# Patient Record
Sex: MALE | Race: WHITE | NOT HISPANIC OR LATINO
[De-identification: names, ages, dates, MRNs, and addresses within clinical notes are randomized per-mention and may not be internally consistent; named-entity substitution may affect disease eponyms.]

---

## 2020-03-24 PROBLEM — Z00.00 ENCOUNTER FOR PREVENTIVE HEALTH EXAMINATION: Status: ACTIVE | Noted: 2020-03-24

## 2020-04-06 ENCOUNTER — APPOINTMENT (OUTPATIENT)
Dept: CARDIOLOGY | Facility: CLINIC | Age: 65
End: 2020-04-06
Payer: COMMERCIAL

## 2020-04-06 ENCOUNTER — NON-APPOINTMENT (OUTPATIENT)
Age: 65
End: 2020-04-06

## 2020-04-06 VITALS
HEART RATE: 52 BPM | DIASTOLIC BLOOD PRESSURE: 86 MMHG | WEIGHT: 177 LBS | SYSTOLIC BLOOD PRESSURE: 140 MMHG | RESPIRATION RATE: 15 BRPM | HEIGHT: 71 IN | BODY MASS INDEX: 24.78 KG/M2

## 2020-04-06 PROCEDURE — 93000 ELECTROCARDIOGRAM COMPLETE: CPT

## 2020-04-06 PROCEDURE — 99204 OFFICE O/P NEW MOD 45 MIN: CPT

## 2020-04-06 NOTE — PHYSICAL EXAM
[General Appearance - Well Developed] : well developed [Normal Appearance] : normal appearance [Well Groomed] : well groomed [General Appearance - Well Nourished] : well nourished [No Deformities] : no deformities [General Appearance - In No Acute Distress] : no acute distress [Normal Conjunctiva] : the conjunctiva exhibited no abnormalities [Normal Oral Mucosa] : normal oral mucosa [Normal Oropharynx] : normal oropharynx [5th Left ICS - MCL] : palpated at the 5th LICS in the midclavicular line [Normal] : normal [No Precordial Heave] : no precordial heave was noted [Normal Rate] : normal [Rhythm Regular] : regular [No Gallop] : no gallop heard [II] : a grade 2 [2+] : left 2+ [No Pitting Edema] : no pitting edema present [Respiration, Rhythm And Depth] : normal respiratory rhythm and effort [Exaggerated Use Of Accessory Muscles For Inspiration] : no accessory muscle use [Auscultation Breath Sounds / Voice Sounds] : lungs were clear to auscultation bilaterally [Bowel Sounds] : normal bowel sounds [Abdomen Soft] : soft [Abnormal Walk] : normal gait [Gait - Sufficient For Exercise Testing] : the gait was sufficient for exercise testing [Nail Clubbing] : no clubbing of the fingernails [Cyanosis, Localized] : no localized cyanosis [Skin Color & Pigmentation] : normal skin color and pigmentation [Skin Turgor] : normal skin turgor [] : no rash [Oriented To Time, Place, And Person] : oriented to person, place, and time [Affect] : the affect was normal [Mood] : the mood was normal [No Anxiety] : not feeling anxious [Click] : no click [Distant] : the heart sounds were ~L not distant [Pericardial Rub] : no pericardial rub

## 2020-04-06 NOTE — DISCUSSION/SUMMARY
[FreeTextEntry1] : This is a 65-year-old male with past medical history significant for supraventricular tachycardia (probably atrial fibrillation), status post ablation at [facility] by Dr. Olivier 2020, status post loop recorder implantation, hypertension, status post appendectomy, history of syncope, "cardiomyopathy", and comes in for cardiac consultation.\par He denies chest pain, shortness of breath, dizziness or syncope.  He has no history of rheumatic fever.\par He does not drink excessive caffeine or alcohol.\par The patient reports that he was told years ago he had a cardiomyopathy, but his ejection fraction is normal.  He reports having a normal exercise stress test a few years ago.  Controlling his hypertension has been difficult, but is seeing his nephrologist, Dr. Wagner with good results.\par His cardiac risk factors include hypertension, positive family history of atherosclerosis (mother  from a stroke at age 57).  He reports having a normal cholesterol.\par Electrocardiogram 2020 demonstrated sinus bradycardia at 52 beats per minutes otherwise unremarkable.\par Patient's medications today include sotalol 80 mg twice a day, Xarelto 15 mg in the evening, Cozaar 100 mg daily, chlorthalidone 25 mg dose in the morning, Coreg 25 mg twice a day and Pepcid 20 mg twice a day.  He is not on aspirin therapy.\par He reports his blood pressures and stable on this medication.  He will followup with his nephrologist and primary care physician.  He also reports to have a history of elevated pulmonary pressures.\par He had been followed at [place] by Dr. Martins in the past.\par He is instructed to schedule echo Doppler examination to evaluate left ventricular function, chamber size, murmur, and rule out hypertrophy.\par  He will followup with his electrophysiologist and nephrologist as well as his primary care physician.\par He is currently hemodynamically stable and asymptomatic from cardiac standpoint.

## 2020-04-06 NOTE — REASON FOR VISIT
[Consultation] : a consultation regarding [Cardiomyopathy] : cardiomyopathy [Hypertension] : hypertension [Mitral Regurgitation] : mitral regurgitation [FreeTextEntry1] : s/p ablation, murmur

## 2020-06-22 ENCOUNTER — APPOINTMENT (OUTPATIENT)
Dept: CARDIOLOGY | Facility: CLINIC | Age: 65
End: 2020-06-22
Payer: COMMERCIAL

## 2020-06-22 ENCOUNTER — NON-APPOINTMENT (OUTPATIENT)
Age: 65
End: 2020-06-22

## 2020-06-22 VITALS
WEIGHT: 174 LBS | HEIGHT: 71 IN | BODY MASS INDEX: 24.36 KG/M2 | HEART RATE: 60 BPM | RESPIRATION RATE: 15 BRPM | SYSTOLIC BLOOD PRESSURE: 125 MMHG | DIASTOLIC BLOOD PRESSURE: 79 MMHG

## 2020-06-22 PROCEDURE — 99213 OFFICE O/P EST LOW 20 MIN: CPT

## 2020-06-22 PROCEDURE — 93000 ELECTROCARDIOGRAM COMPLETE: CPT

## 2020-06-22 PROCEDURE — 93306 TTE W/DOPPLER COMPLETE: CPT

## 2020-08-25 RX ORDER — CARVEDILOL 25 MG/1
25 TABLET, FILM COATED ORAL
Qty: 180 | Refills: 1 | Status: DISCONTINUED | COMMUNITY
Start: 2020-04-06 | End: 2020-08-25

## 2020-08-31 ENCOUNTER — APPOINTMENT (OUTPATIENT)
Dept: CARDIOLOGY | Facility: CLINIC | Age: 65
End: 2020-08-31
Payer: COMMERCIAL

## 2020-08-31 VITALS
WEIGHT: 180 LBS | DIASTOLIC BLOOD PRESSURE: 80 MMHG | OXYGEN SATURATION: 99 % | RESPIRATION RATE: 16 BRPM | BODY MASS INDEX: 25.2 KG/M2 | HEIGHT: 71 IN | HEART RATE: 67 BPM | SYSTOLIC BLOOD PRESSURE: 128 MMHG

## 2020-08-31 PROCEDURE — 99213 OFFICE O/P EST LOW 20 MIN: CPT

## 2020-11-17 ENCOUNTER — NON-APPOINTMENT (OUTPATIENT)
Age: 65
End: 2020-11-17

## 2020-11-17 ENCOUNTER — APPOINTMENT (OUTPATIENT)
Dept: CARDIOLOGY | Facility: CLINIC | Age: 65
End: 2020-11-17
Payer: COMMERCIAL

## 2020-11-17 VITALS
DIASTOLIC BLOOD PRESSURE: 88 MMHG | RESPIRATION RATE: 16 BRPM | TEMPERATURE: 97.9 F | SYSTOLIC BLOOD PRESSURE: 132 MMHG | HEIGHT: 71 IN | BODY MASS INDEX: 25.48 KG/M2 | WEIGHT: 182 LBS | HEART RATE: 59 BPM

## 2020-11-17 PROCEDURE — 93000 ELECTROCARDIOGRAM COMPLETE: CPT

## 2020-11-17 PROCEDURE — 99214 OFFICE O/P EST MOD 30 MIN: CPT

## 2020-11-18 ENCOUNTER — NON-APPOINTMENT (OUTPATIENT)
Age: 65
End: 2020-11-18

## 2021-02-16 ENCOUNTER — NON-APPOINTMENT (OUTPATIENT)
Age: 66
End: 2021-02-16

## 2021-02-19 ENCOUNTER — APPOINTMENT (OUTPATIENT)
Dept: ORTHOPEDIC SURGERY | Facility: CLINIC | Age: 66
End: 2021-02-19
Payer: COMMERCIAL

## 2021-02-19 DIAGNOSIS — M51.36 OTHER INTERVERTEBRAL DISC DEGENERATION, LUMBAR REGION: ICD-10-CM

## 2021-02-19 DIAGNOSIS — M79.10 MYALGIA, UNSPECIFIED SITE: ICD-10-CM

## 2021-02-19 DIAGNOSIS — M60.9 MYOSITIS, UNSPECIFIED: ICD-10-CM

## 2021-02-19 PROCEDURE — 99204 OFFICE O/P NEW MOD 45 MIN: CPT | Mod: 25

## 2021-02-19 PROCEDURE — 72100 X-RAY EXAM L-S SPINE 2/3 VWS: CPT

## 2021-02-19 PROCEDURE — 20552 NJX 1/MLT TRIGGER POINT 1/2: CPT

## 2021-02-19 PROCEDURE — 99072 ADDL SUPL MATRL&STAF TM PHE: CPT

## 2021-03-01 ENCOUNTER — RX RENEWAL (OUTPATIENT)
Age: 66
End: 2021-03-01

## 2021-04-12 ENCOUNTER — LABORATORY RESULT (OUTPATIENT)
Age: 66
End: 2021-04-12

## 2021-04-13 ENCOUNTER — NON-APPOINTMENT (OUTPATIENT)
Age: 66
End: 2021-04-13

## 2021-04-13 ENCOUNTER — APPOINTMENT (OUTPATIENT)
Dept: CARDIOLOGY | Facility: CLINIC | Age: 66
End: 2021-04-13
Payer: COMMERCIAL

## 2021-04-13 VITALS
DIASTOLIC BLOOD PRESSURE: 80 MMHG | BODY MASS INDEX: 26.04 KG/M2 | TEMPERATURE: 98 F | RESPIRATION RATE: 16 BRPM | HEIGHT: 71 IN | HEART RATE: 65 BPM | WEIGHT: 186 LBS | SYSTOLIC BLOOD PRESSURE: 140 MMHG

## 2021-04-13 PROCEDURE — 99214 OFFICE O/P EST MOD 30 MIN: CPT

## 2021-04-13 PROCEDURE — 93000 ELECTROCARDIOGRAM COMPLETE: CPT

## 2021-04-13 PROCEDURE — 99072 ADDL SUPL MATRL&STAF TM PHE: CPT

## 2021-04-13 NOTE — PHYSICAL EXAM
[General Appearance - Well Developed] : well developed [Normal Appearance] : normal appearance [Well Groomed] : well groomed [General Appearance - Well Nourished] : well nourished [No Deformities] : no deformities [General Appearance - In No Acute Distress] : no acute distress [Normal Conjunctiva] : the conjunctiva exhibited no abnormalities [Normal Oral Mucosa] : normal oral mucosa [Normal Oropharynx] : normal oropharynx [Normal Jugular Venous V Waves Present] : normal jugular venous V waves present [Respiration, Rhythm And Depth] : normal respiratory rhythm and effort [Exaggerated Use Of Accessory Muscles For Inspiration] : no accessory muscle use [Auscultation Breath Sounds / Voice Sounds] : lungs were clear to auscultation bilaterally [Bowel Sounds] : normal bowel sounds [Abdomen Soft] : soft [Abnormal Walk] : normal gait [Gait - Sufficient For Exercise Testing] : the gait was sufficient for exercise testing [Nail Clubbing] : no clubbing of the fingernails [Cyanosis, Localized] : no localized cyanosis [Skin Color & Pigmentation] : normal skin color and pigmentation [Skin Turgor] : normal skin turgor [] : no rash [Oriented To Time, Place, And Person] : oriented to person, place, and time [Impaired Insight] : insight and judgment were intact [Affect] : the affect was normal [Mood] : the mood was normal [No Anxiety] : not feeling anxious [5th Left ICS - MCL] : palpated at the 5th LICS in the midclavicular line [Normal] : normal [No Precordial Heave] : no precordial heave was noted [Normal Rate] : normal [Rhythm Regular] : regular [Normal S1] : normal S1 [Normal S2] : normal S2 [No Gallop] : no gallop heard [II] : a grade 2 [2+] : left 2+ [No Abnormalities] : the abdominal aorta was not enlarged and no bruit was heard [No Pitting Edema] : no pitting edema present [S3] : no S3 [S4] : no S4 [Click] : no click [Pericardial Rub] : no pericardial rub

## 2021-04-13 NOTE — REASON FOR VISIT
[Follow-Up - Clinic] : a clinic follow-up of [Cardiomyopathy] : cardiomyopathy [Hypertension] : hypertension [Mitral Regurgitation] : mitral regurgitation [FreeTextEntry1] : This is a 65 year year old male here today for follow up cardiac followup evaluation. \par He has a past medical history significant for  supraventricular tachycardia (probably atrial fibrillation), status post ablation at [facility] by Dr. Olivier February 2020, status post loop recorder implantation, hypertension, status post appendectomy, history of syncope, "cardiomyopathy".\par \par Today he is feeling generally well and does not have any complaints at this time. He does admit that he is non compliant with his blood pressure medications because at home his home readings he is reporting to be under 100.\par \par He denies fever, chills, weight loss, malaise, rash, alteration bowel habits, weakness, abdominal  pain, bloating, changes in urination, visual disturbances, chest pain, headaches, dizziness, heart palpitations, recent episodes of syncope or falls, SOB, or dyspnea at this time.\par \par

## 2021-04-13 NOTE — ASSESSMENT
[FreeTextEntry1] : This is a 65 year year old male here today for follow up cardiac followup evaluation. \par He has a past medical history significant for  supraventricular tachycardia (probably atrial fibrillation), status post ablation at [facility] by Dr. Olivier February 2020, status post loop recorder implantation, hypertension, status post appendectomy, history of syncope, "cardiomyopathy".\par \par Today he is feeling generally well and does not have any complaints at this time. He does admit that he is non compliant with his blood pressure medications because at home his home readings he is reporting to be under 100.\par \par -Pt is stable from a cardiac standpoint and does not have any complaints at this time. He did have injections done in his back by Dr. Calderon but he states he will "always have back problems".\par \par -BP is elevated in today's visit and patient is on Carvedilol 12.5mg PO BID, Chlorthalidone 25mg PO daily in the morning, Losartan 100mg PO Daily in the evening, Sotalol 80mg (40 mg BID). He states that he does not wish to add or change medication at this time. He is also refusing a 24 hour BP cuff to assess what his home BP is. He states that he has his own BP cuff at home but I explained that this is not efficient enough. I have asked that he follow up in 1 month and to bring his home BP cuff with him to his next appointment. \par \par -New blood work done today to evaluate lipid profile. He is currently on Rosuvastatin 20mg PO DAILY and was placed on this due to ASCVD Risk of 18% based off of his previous blood work. \par \par -EKG done 4/13/2021 which demonstrated regular sinus rhythm with nonspecific ST-T wave changes BPM of 65. He is on Xarelto 20mg PO daily for management of his AFIB.\par \par -The patient had an exercise stress test done "a couple of years ago" and does not wish to do one at this time. He is refusing to have this repeated.\par  \par -Echocardiogram done June 22, 20202 demonstrated minimal mitral, pulmonic and tricuspid regurgitation with normal LVSF.\par \par I have discussed the plan of care with Mr. DAISY SHAH  and he  will follow up in 1 month. He is compliant with all of his  medications.\par \par The patient understands that aerobic exercises must be increased to minutes 4 times/week and a detailed discussion of lifestyle modification was done today. \par The patient has a good understanding of the diagnosis, treatment plan and lifestyle modification. \par He will contact me at the office for any questions with their care or any changes in their health status.\par \par The plan of care was discussed with supervising physician, Dr. Hopper while present in the office at the time of the visit. \par \par Antione HARRINGTON

## 2021-04-13 NOTE — DISCUSSION/SUMMARY
[FreeTextEntry1] : Dr. Hopper [facility] (PRIOR VISIT): \par This is a 65-year-old male with past medical history significant for supraventricular tachycardia (probably atrial fibrillation), status post ablation at [facility] by Dr. Olivier 2020, status post loop recorder implantation, hypertension, status post appendectomy, history of syncope, "cardiomyopathy", and comes in for cardiac follow-up evaluation.\par The patient remained stable from a cardiac standpoint.  He does report occasional episodes of lower blood pressure without symptoms.  I have asked him to contact his nephrologist regarding this issue.  For now I have asked him to take his chlorthalidone in the morning and his losartan in the evening.  He remains on Coreg and sotalol twice per day\par He denies chest pain, shortness of breath, dizziness or syncope.  He has no history of rheumatic fever.\par He does not drink excessive caffeine or alcohol.\par The patient reports that he was told years ago he had an enlarged heart consistent with a cardiomyopathy, but his ejection fraction is normal.  He reports having a normal exercise stress test a few years ago.  Controlling his hypertension has been difficult, but is seeing his nephrologist, Dr. Wagner with good results.\par The patient had blood work done 2020 which demonstrated a creatinine of 1.18, GFR 65 cc/min cholesterol 174, triglycerides 82, HDL of 36, and direct LDL of 128 mg/dL.\par I have informed the patient that based on his creatinine and renal function his Xarelto dose should be 20 mg/day in the evening.  The patient had been taking the dosage in the morning but will now change to the evening.  He will contact his nephrologist to lower the dose in the past, as well as his electrophysiologist regarding adjustment in Xarelto dosage.\par \par His cardiac risk factors include hypertension, positive family history of atherosclerosis (mother  from a stroke at age 57).  He reports having a normal cholesterol.\par Electrocardiogram 2020 demonstrated sinus bradycardia at 58 beats per minutes otherwise unremarkable.\par Patient's medications today include sotalol 80 mg twice a day, Xarelto 15 mg in the evening, Cozaar 100 mg daily, chlorthalidone 25 mg dose in the morning, Coreg 25 mg twice a day and Pepcid 20 mg twice a \par He had been followed at [place] by Dr. Martins in the past.\par He will have an echo Doppler examination to evaluate left ventricular function, chamber size, murmur, and rule out hypertrophy.\par He is currently hemodynamically stable and asymptomatic from cardiac standpoint.

## 2021-04-14 RX ORDER — TIZANIDINE 4 MG/1
4 TABLET ORAL
Qty: 30 | Refills: 0 | Status: COMPLETED | COMMUNITY
Start: 2021-02-19 | End: 2021-04-14

## 2021-04-14 RX ORDER — METHYLPREDNISOLONE 4 MG/1
4 TABLET ORAL
Qty: 1 | Refills: 1 | Status: COMPLETED | COMMUNITY
Start: 2021-02-19 | End: 2021-04-14

## 2021-04-16 ENCOUNTER — NON-APPOINTMENT (OUTPATIENT)
Age: 66
End: 2021-04-16

## 2021-04-27 ENCOUNTER — NON-APPOINTMENT (OUTPATIENT)
Age: 66
End: 2021-04-27

## 2021-05-11 ENCOUNTER — RX RENEWAL (OUTPATIENT)
Age: 66
End: 2021-05-11

## 2021-07-08 ENCOUNTER — NON-APPOINTMENT (OUTPATIENT)
Age: 66
End: 2021-07-08

## 2021-07-14 ENCOUNTER — NON-APPOINTMENT (OUTPATIENT)
Age: 66
End: 2021-07-14

## 2021-07-26 ENCOUNTER — NON-APPOINTMENT (OUTPATIENT)
Age: 66
End: 2021-07-26

## 2021-07-29 ENCOUNTER — NON-APPOINTMENT (OUTPATIENT)
Age: 66
End: 2021-07-29

## 2021-08-16 ENCOUNTER — APPOINTMENT (OUTPATIENT)
Dept: CARDIOLOGY | Facility: CLINIC | Age: 66
End: 2021-08-16

## 2021-10-25 ENCOUNTER — RX RENEWAL (OUTPATIENT)
Age: 66
End: 2021-10-25

## 2021-11-10 ENCOUNTER — APPOINTMENT (OUTPATIENT)
Dept: CARDIOLOGY | Facility: CLINIC | Age: 66
End: 2021-11-10

## 2022-01-12 ENCOUNTER — LABORATORY RESULT (OUTPATIENT)
Age: 67
End: 2022-01-12

## 2022-01-12 ENCOUNTER — APPOINTMENT (OUTPATIENT)
Dept: CARDIOLOGY | Facility: CLINIC | Age: 67
End: 2022-01-12
Payer: COMMERCIAL

## 2022-01-12 ENCOUNTER — NON-APPOINTMENT (OUTPATIENT)
Age: 67
End: 2022-01-12

## 2022-01-12 VITALS
OXYGEN SATURATION: 97 % | HEIGHT: 71 IN | TEMPERATURE: 98 F | SYSTOLIC BLOOD PRESSURE: 129 MMHG | HEART RATE: 58 BPM | WEIGHT: 175 LBS | BODY MASS INDEX: 24.5 KG/M2 | DIASTOLIC BLOOD PRESSURE: 81 MMHG | RESPIRATION RATE: 15 BRPM

## 2022-01-12 PROCEDURE — 99072 ADDL SUPL MATRL&STAF TM PHE: CPT

## 2022-01-12 PROCEDURE — 99214 OFFICE O/P EST MOD 30 MIN: CPT

## 2022-01-12 PROCEDURE — 93000 ELECTROCARDIOGRAM COMPLETE: CPT

## 2022-01-12 RX ORDER — FAMOTIDINE 20 MG/1
20 TABLET, FILM COATED ORAL TWICE DAILY
Refills: 0 | Status: DISCONTINUED | COMMUNITY
Start: 2020-04-06 | End: 2022-01-12

## 2022-01-12 NOTE — ASSESSMENT
[FreeTextEntry1] : Prior note nurse practitioner Lorena April 13, 2021::\par \par This is a 65 year year old male here today for follow up cardiac followup evaluation. \par He has a past medical history significant for  supraventricular tachycardia (probably atrial fibrillation), status post ablation at [facility] by Dr. Olivier February 2020, status post loop recorder implantation, hypertension, status post appendectomy, history of syncope, "cardiomyopathy".\par \par Today he is feeling generally well and does not have any complaints at this time. He does admit that he is non compliant with his blood pressure medications because at home his home readings he is reporting to be under 100.\par \par -Pt is stable from a cardiac standpoint and does not have any complaints at this time. He did have injections done in his back by Dr. Calderon but he states he will "always have back problems".\par \par -BP is elevated in today's visit and patient is on Carvedilol 12.5mg PO BID, Chlorthalidone 25mg PO daily in the morning, Losartan 100mg PO Daily in the evening, Sotalol 80mg (40 mg BID). He states that he does not wish to add or change medication at this time. He is also refusing a 24 hour BP cuff to assess what his home BP is. He states that he has his own BP cuff at home but I explained that this is not efficient enough. I have asked that he follow up in 1 month and to bring his home BP cuff with him to his next appointment. \par \par -New blood work done today to evaluate lipid profile. He is currently on Rosuvastatin 20mg PO DAILY and was placed on this due to ASCVD Risk of 18% based off of his previous blood work. \par \par -EKG done 4/13/2021 which demonstrated regular sinus rhythm with nonspecific ST-T wave changes BPM of 65. He is on Xarelto 20mg PO daily for management of his AFIB.\par \par -The patient had an exercise stress test done "a couple of years ago" and does not wish to do one at this time. He is refusing to have this repeated.\par  \par -Echocardiogram done June 22, 20202 demonstrated minimal mitral, pulmonic and tricuspid regurgitation with normal LVSF.\par \par I have discussed the plan of care with Mr. DAISY SHAH  and he  will follow up in 1 month. He is compliant with all of his  medications.\par \par The patient understands that aerobic exercises must be increased to minutes 4 times/week and a detailed discussion of lifestyle modification was done today. \par The patient has a good understanding of the diagnosis, treatment plan and lifestyle modification. \par He will contact me at the office for any questions with their care or any changes in their health status.\par \par The plan of care was discussed with supervising physician, Dr. Hopper while present in the office at the time of the visit. \par \par Antione HARRINGTON

## 2022-01-12 NOTE — REASON FOR VISIT
[CV Risk Factors and Non-Cardiac Disease] : CV risk factors and non-cardiac disease [Hyperlipidemia] : hyperlipidemia [Follow-Up - Clinic] : a clinic follow-up of [Cardiomyopathy] : cardiomyopathy [Hypertension] : hypertension [Mitral Regurgitation] : mitral regurgitation [FreeTextEntry1] : s/p ablation, murmur

## 2022-01-12 NOTE — DISCUSSION/SUMMARY
[FreeTextEntry1] : This is a 66-year-old male with past medical history significant for supraventricular tachycardia (probably atrial fibrillation), status post ablation at [facility] by Dr. Olivier 2020, status post loop recorder implantation, hypertension, status post appendectomy, history of syncope, "cardiomyopathy", and comes in for cardiac follow-up evaluation.  He denies chest pain, shortness of breath, palpitations, dizziness or syncope.\par Electrocardiogram done 2022 demonstrates sinus bradycardia rate 58 beats per minutes otherwise remarkable for poor R wave progression.\par The patient is feeling well at this time.  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  He also is riding his stationary bike and feels that he needs to improve his tone and aerobic capacity.\par He will have new blood work done today for lipid profile.\par He will continue his exercise on a regular basis.\par He does not wish to pursue any type of diagnostic evaluation including exercise stress test at this time.\par \par The patient remained stable from a cardiac standpoint.  He does report occasional episodes of lower blood pressure without symptoms.  I have asked him to contact his nephrologist regarding this issue.  For now I have asked him to take his chlorthalidone in the morning and his losartan in the evening.  He remains on Coreg and sotalol twice per day\par He denies chest pain, shortness of breath, dizziness or syncope.  He has no history of rheumatic fever.\par He does not drink excessive caffeine or alcohol.\par The patient reports that he was told years ago he had an enlarged heart consistent with a cardiomyopathy, but his ejection fraction is normal.  He reports having a normal exercise stress test a few years ago.  Controlling his hypertension has been difficult, but is seeing his nephrologist, Dr. Wagner with good results.\par The patient had blood work done 2020 which demonstrated a creatinine of 1.18, GFR 65 cc/min cholesterol 174, triglycerides 82, HDL of 36, and direct LDL of 128 mg/dL.\par I have informed the patient that based on his creatinine and renal function his Xarelto dose should be 20 mg/day in the evening.  The patient had been taking the dosage in the morning but will now change to the evening.  He will contact his nephrologist to lower the dose in the past, as well as his electrophysiologist regarding adjustment in Xarelto dosage.\par \par His cardiac risk factors include hypertension, positive family history of atherosclerosis (mother  from a stroke at age 57).  He reports having a normal cholesterol.\par Electrocardiogram 2020 demonstrated sinus bradycardia at 58 beats per minutes otherwise unremarkable.\par Patient's medications today include sotalol 80 mg twice a day, Xarelto 15 mg in the evening, Cozaar 100 mg daily, chlorthalidone 25 mg dose in the morning, Coreg 25 mg twice a day and Pepcid 20 mg twice a \par He had been followed at [place] by Dr. Martins in the past.\par He will have an echo Doppler examination to evaluate left ventricular function, chamber size, murmur, and rule out hypertrophy.\par He is currently hemodynamically stable and asymptomatic from cardiac standpoint.

## 2022-06-08 ENCOUNTER — APPOINTMENT (OUTPATIENT)
Dept: CARDIOLOGY | Facility: CLINIC | Age: 67
End: 2022-06-08
Payer: COMMERCIAL

## 2022-06-08 ENCOUNTER — NON-APPOINTMENT (OUTPATIENT)
Age: 67
End: 2022-06-08

## 2022-06-08 VITALS
BODY MASS INDEX: 24.08 KG/M2 | WEIGHT: 172 LBS | OXYGEN SATURATION: 98 % | HEIGHT: 71 IN | RESPIRATION RATE: 15 BRPM | TEMPERATURE: 98.5 F | HEART RATE: 51 BPM

## 2022-06-08 PROCEDURE — 99214 OFFICE O/P EST MOD 30 MIN: CPT | Mod: 25

## 2022-06-08 PROCEDURE — 93000 ELECTROCARDIOGRAM COMPLETE: CPT

## 2022-06-08 NOTE — DISCUSSION/SUMMARY
[FreeTextEntry1] : This is a 66-year-old male with past medical history significant for supraventricular tachycardia (probably atrial fibrillation), status post ablation at [facility] by Dr. Olivier 2020, status post loop recorder implantation, hypertension, status post appendectomy, history of syncope, "cardiomyopathy", and comes in for cardiac follow-up evaluation.  He denies chest pain, shortness of breath, palpitations, dizziness or syncope.\par Electrocardiogram done 2022 demonstrates sinus bradycardia rate 58 beats per minutes otherwise remarkable for poor R wave progression.\par The patient is feeling well at this time.  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  He also is riding his stationary bike and feels that he needs to improve his tone and aerobic capacity.\par He will have new blood work done today for lipid profile.\par He will continue his exercise on a regular basis.\par He does not wish to pursue any type of diagnostic evaluation including exercise stress test at this time.\par \par The patient remained stable from a cardiac standpoint.  He does report occasional episodes of lower blood pressure without symptoms.  I have asked him to contact his nephrologist regarding this issue.  For now I have asked him to take his chlorthalidone in the morning and his losartan in the evening.  He remains on Coreg and sotalol twice per day\par He denies chest pain, shortness of breath, dizziness or syncope.  He has no history of rheumatic fever.\par He does not drink excessive caffeine or alcohol.\par The patient reports that he was told years ago he had an enlarged heart consistent with a cardiomyopathy, but his ejection fraction is normal.  He reports having a normal exercise stress test a few years ago.  Controlling his hypertension has been difficult, but is seeing his nephrologist, Dr. Wagner with good results.\par The patient had blood work done 2020 which demonstrated a creatinine of 1.18, GFR 65 cc/min cholesterol 174, triglycerides 82, HDL of 36, and direct LDL of 128 mg/dL.\par I have informed the patient that based on his creatinine and renal function his Xarelto dose should be 20 mg/day in the evening.  The patient had been taking the dosage in the morning but will now change to the evening.  He will contact his nephrologist to lower the dose in the past, as well as his electrophysiologist regarding adjustment in Xarelto dosage.\par \par His cardiac risk factors include hypertension, positive family history of atherosclerosis (mother  from a stroke at age 57).  He reports having a normal cholesterol.\par Electrocardiogram 2020 demonstrated sinus bradycardia at 58 beats per minutes otherwise unremarkable.\par Patient's medications today include sotalol 80 mg twice a day, Xarelto 15 mg in the evening, Cozaar 100 mg daily, chlorthalidone 25 mg dose in the morning, Coreg 25 mg twice a day and Pepcid 20 mg twice a \par He had been followed at [place] by Dr. Martins in the past.\par He will have an echo Doppler examination to evaluate left ventricular function, chamber size, murmur, and rule out hypertrophy.\par He is currently hemodynamically stable and asymptomatic from cardiac standpoint.

## 2022-06-08 NOTE — PHYSICAL EXAM
[General Appearance - Well Developed] : well developed [Normal Appearance] : normal appearance [Well Groomed] : well groomed [General Appearance - Well Nourished] : well nourished [No Deformities] : no deformities [General Appearance - In No Acute Distress] : no acute distress [Normal Oral Mucosa] : normal oral mucosa [Normal Oropharynx] : normal oropharynx [Normal Jugular Venous V Waves Present] : normal jugular venous V waves present [Respiration, Rhythm And Depth] : normal respiratory rhythm and effort [Exaggerated Use Of Accessory Muscles For Inspiration] : no accessory muscle use [Auscultation Breath Sounds / Voice Sounds] : lungs were clear to auscultation bilaterally [Bowel Sounds] : normal bowel sounds [Abdomen Soft] : soft [Abnormal Walk] : normal gait [Gait - Sufficient For Exercise Testing] : the gait was sufficient for exercise testing [Nail Clubbing] : no clubbing of the fingernails [Cyanosis, Localized] : no localized cyanosis [Skin Color & Pigmentation] : normal skin color and pigmentation [Skin Turgor] : normal skin turgor [] : no rash [Oriented To Time, Place, And Person] : oriented to person, place, and time [Impaired Insight] : insight and judgment were intact [Affect] : the affect was normal [Mood] : the mood was normal [No Anxiety] : not feeling anxious [No Abnormalities] : the abdominal aorta was not enlarged and no bruit was heard [Well Developed] : well developed [Well Nourished] : well nourished [No Acute Distress] : no acute distress [Normal Conjunctiva] : normal conjunctiva [Normal Venous Pressure] : normal venous pressure [No Carotid Bruit] : no carotid bruit [Normal S1, S2] : normal S1, S2 [No Murmur] : no murmur [No Rub] : no rub [5th Left ICS - MCL] : palpated at the 5th LICS in the midclavicular line [Normal] : normal [No Precordial Heave] : no precordial heave was noted [Normal Rate] : normal [Rhythm Regular] : regular [Normal S1] : normal S1 [Normal S2] : normal S2 [No Gallop] : no gallop heard [II] : a grade 2 [No Pitting Edema] : no pitting edema present [2+] : left 2+ [Clear Lung Fields] : clear lung fields [Good Air Entry] : good air entry [No Respiratory Distress] : no respiratory distress  [Soft] : abdomen soft [Non Tender] : non-tender [No Masses/organomegaly] : no masses/organomegaly [Normal Bowel Sounds] : normal bowel sounds [Normal Gait] : normal gait [No Edema] : no edema [No Cyanosis] : no cyanosis [No Clubbing] : no clubbing [No Varicosities] : no varicosities [No Rash] : no rash [No Skin Lesions] : no skin lesions [Moves all extremities] : moves all extremities [No Focal Deficits] : no focal deficits [Normal Speech] : normal speech [Alert and Oriented] : alert and oriented [Normal memory] : normal memory [S3] : no S3 [S4] : no S4 [Click] : no click [Pericardial Rub] : no pericardial rub

## 2022-06-08 NOTE — ASSESSMENT
[FreeTextEntry1] : Prior note nurse practitioner Lorena April 13, 2021::\par \par This is a 65 year year old male here today for follow up cardiac followup evaluation. \par He has a past medical history significant for  supraventricular tachycardia (probably atrial fibrillation), status post ablation at [facility] by Dr. Olivier February 2020, status post loop recorder implantation, hypertension, status post appendectomy, history of syncope, "cardiomyopathy".\par \par Today he is feeling generally well and does not have any complaints at this time. He does admit that he is non compliant with his blood pressure medications because at home his home readings he is reporting to be under 100.\par \par -Pt is stable from a cardiac standpoint and does not have any complaints at this time. He did have injections done in his back by Dr. Calderon but he states he will "always have back problems".\par \par -BP is elevated in today's visit and patient is on Carvedilol 12.5mg PO BID, Chlorthalidone 25mg PO daily in the morning, Losartan 100mg PO Daily in the evening, Sotalol 80mg (40 mg BID). He states that he does not wish to add or change medication at this time. He is also refusing a 24 hour BP cuff to assess what his home BP is. He states that he has his own BP cuff at home but I explained that this is not efficient enough. I have asked that he follow up in 1 month and to bring his home BP cuff with him to his next appointment. \par \par -New blood work done today to evaluate lipid profile. He is currently on Rosuvastatin 20mg PO DAILY and was placed on this due to ASCVD Risk of 18% based off of his previous blood work. \par \par -EKG done 4/13/2021 which demonstrated regular sinus rhythm with nonspecific ST-T wave changes BPM of 65. He is on Xarelto 20mg PO daily for management of his AFIB.\par \par -The patient had an exercise stress test done "a couple of years ago" and does not wish to do one at this time. He is refusing to have this repeated.\par  \par -Echocardiogram done June 22, 20202 demonstrated minimal mitral, pulmonic and tricuspid regurgitation with normal LVSF.\par \par I have discussed the plan of care with Mr. DAISY SHAH  and he  will follow up in 1 month. He is compliant with all of his  medications.\par \par The patient understands that aerobic exercises must be increased to minutes 4 times/week and a detailed discussion of lifestyle modification was done today. \par The patient has a good understanding of the diagnosis, treatment plan and lifestyle modification. \par He will contact me at the office for any questions with their care or any changes in their health status.\par \par The plan of care was discussed with supervising physician, Dr. Hopper while present in the office at the time of the visit. \par \par Antione HARRINGTON

## 2022-06-08 NOTE — DISCUSSION/SUMMARY
[FreeTextEntry1] : This is a 66-year-old male with past medical history significant for supraventricular tachycardia (probably atrial fibrillation), status post ablation at [facility] by Dr. Olivier 2020, status post loop recorder implantation, hypertension, status post appendectomy, history of syncope, "cardiomyopathy", and comes in for cardiac follow-up evaluation.  He denies chest pain, shortness of breath, palpitations, dizziness or syncope.\par Electrocardiogram done 2022 demonstrates sinus bradycardia rate 58 beats per minutes otherwise remarkable for poor R wave progression.\par The patient is feeling well at this time.  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  He also is riding his stationary bike and feels that he needs to improve his tone and aerobic capacity.\par He will have new blood work done today for lipid profile.\par He will continue his exercise on a regular basis.\par He does not wish to pursue any type of diagnostic evaluation including exercise stress test at this time.\par \par The patient remained stable from a cardiac standpoint.  He does report occasional episodes of lower blood pressure without symptoms.  I have asked him to contact his nephrologist regarding this issue.  For now I have asked him to take his chlorthalidone in the morning and his losartan in the evening.  He remains on Coreg and sotalol twice per day\par He denies chest pain, shortness of breath, dizziness or syncope.  He has no history of rheumatic fever.\par He does not drink excessive caffeine or alcohol.\par The patient reports that he was told years ago he had an enlarged heart consistent with a cardiomyopathy, but his ejection fraction is normal.  He reports having a normal exercise stress test a few years ago.  Controlling his hypertension has been difficult, but is seeing his nephrologist, Dr. Wagner with good results.\par The patient had blood work done 2020 which demonstrated a creatinine of 1.18, GFR 65 cc/min cholesterol 174, triglycerides 82, HDL of 36, and direct LDL of 128 mg/dL.\par I have informed the patient that based on his creatinine and renal function his Xarelto dose should be 20 mg/day in the evening.  The patient had been taking the dosage in the morning but will now change to the evening.  He will contact his nephrologist to lower the dose in the past, as well as his electrophysiologist regarding adjustment in Xarelto dosage.\par \par His cardiac risk factors include hypertension, positive family history of atherosclerosis (mother  from a stroke at age 57).  He reports having a normal cholesterol.\par Electrocardiogram 2020 demonstrated sinus bradycardia at 58 beats per minutes otherwise unremarkable.\par Patient's medications today include sotalol 80 mg twice a day, Xarelto 15 mg in the evening, Cozaar 100 mg daily, chlorthalidone 25 mg dose in the morning, Coreg 25 mg twice a day and Pepcid 20 mg twice a \par He had been followed at [place] by Dr. Martins in the past.\par He will have an echo Doppler examination to evaluate left ventricular function, chamber size, murmur, and rule out hypertrophy.\par He is currently hemodynamically stable and asymptomatic from cardiac standpoint.

## 2022-06-08 NOTE — ASSESSMENT
[FreeTextEntry1] : Prior note nurse practitioner Lorena April 13, 2021::\par \par This is a 65 year year old male here today for follow up cardiac followup evaluation. \par He has a past medical history significant for  supraventricular tachycardia (probably atrial fibrillation), status post ablation at [facility] by Dr. Olivier February 2020, status post loop recorder implantation, hypertension, status post appendectomy, history of syncope, "cardiomyopathy".\par \par Today he is feeling generally well and does not have any complaints at this time. He does admit that he is non compliant with his blood pressure medications because at home his home readings he is reporting to be under 100.\par \par -Pt is stable from a cardiac standpoint and does not have any complaints at this time. He did have injections done in his back by Dr. Calderon but he states he will "always have back problems".\par \par -BP is elevated in today's visit and patient is on Carvedilol 12.5mg PO BID, Chlorthalidone 25mg PO daily in the morning, Losartan 100mg PO Daily in the evening, Sotalol 80mg (40 mg BID). He states that he does not wish to add or change medication at this time. He is also refusing a 24 hour BP cuff to assess what his home BP is. He states that he has his own BP cuff at home but I explained that this is not efficient enough. I have asked that he follow up in 1 month and to bring his home BP cuff with him to his next appointment. \par \par -New blood work done today to evaluate lipid profile. He is currently on Rosuvastatin 20mg PO DAILY and was placed on this due to ASCVD Risk of 18% based off of his previous blood work. \par \par -EKG done 4/13/2021 which demonstrated regular sinus rhythm with nonspecific ST-T wave changes BPM of 65. He is on Xarelto 20mg PO daily for management of his AFIB.\par \par -The patient had an exercise stress test done "a couple of years ago" and does not wish to do one at this time. He is refusing to have this repeated.\par  \par -Echocardiogram done June 22, 20202 demonstrated minimal mitral, pulmonic and tricuspid regurgitation with normal LVSF.\par \par I have discussed the plan of care with Mr. DAISY SHAH  and he  will follow up in 1 month. He is compliant with all of his  medications.\par \par The patient understands that aerobic exercises must be increased to minutes 4 times/week and a detailed discussion of lifestyle modification was done today. \par The patient has a good understanding of the diagnosis, treatment plan and lifestyle modification. \par He will contact me at the office for any questions with their care or any changes in their health status.\par \par The plan of care was discussed with supervising physician, Dr. Hopper while present in the office at the time of the visit. \par \par Antione HARRINGTON

## 2022-08-08 ENCOUNTER — NON-APPOINTMENT (OUTPATIENT)
Age: 67
End: 2022-08-08

## 2022-08-10 ENCOUNTER — NON-APPOINTMENT (OUTPATIENT)
Age: 67
End: 2022-08-10

## 2022-08-22 ENCOUNTER — NON-APPOINTMENT (OUTPATIENT)
Age: 67
End: 2022-08-22

## 2022-12-05 ENCOUNTER — RX RENEWAL (OUTPATIENT)
Age: 67
End: 2022-12-05

## 2022-12-13 ENCOUNTER — APPOINTMENT (OUTPATIENT)
Dept: CARDIOLOGY | Facility: CLINIC | Age: 67
End: 2022-12-13
Payer: COMMERCIAL

## 2022-12-13 ENCOUNTER — LABORATORY RESULT (OUTPATIENT)
Age: 67
End: 2022-12-13

## 2022-12-13 VITALS
RESPIRATION RATE: 16 BRPM | DIASTOLIC BLOOD PRESSURE: 80 MMHG | OXYGEN SATURATION: 97 % | HEART RATE: 63 BPM | TEMPERATURE: 97.6 F | BODY MASS INDEX: 23.8 KG/M2 | SYSTOLIC BLOOD PRESSURE: 130 MMHG | WEIGHT: 170 LBS | HEIGHT: 71 IN

## 2022-12-13 DIAGNOSIS — R06.00 DYSPNEA, UNSPECIFIED: ICD-10-CM

## 2022-12-13 PROCEDURE — 93306 TTE W/DOPPLER COMPLETE: CPT

## 2022-12-13 PROCEDURE — 93015 CV STRESS TEST SUPVJ I&R: CPT

## 2022-12-13 PROCEDURE — 99213 OFFICE O/P EST LOW 20 MIN: CPT | Mod: 25

## 2022-12-13 NOTE — PHYSICAL EXAM
[Well Developed] : well developed [Well Nourished] : well nourished [No Acute Distress] : no acute distress [Normal Venous Pressure] : normal venous pressure [No Carotid Bruit] : no carotid bruit [Normal S1, S2] : normal S1, S2 [No Murmur] : no murmur [No Rub] : no rub [Clear Lung Fields] : clear lung fields [Good Air Entry] : good air entry [No Respiratory Distress] : no respiratory distress  [Soft] : abdomen soft [Non Tender] : non-tender [No Masses/organomegaly] : no masses/organomegaly [Normal Bowel Sounds] : normal bowel sounds [Normal Gait] : normal gait [No Edema] : no edema [No Cyanosis] : no cyanosis [No Clubbing] : no clubbing [No Varicosities] : no varicosities [No Rash] : no rash [No Skin Lesions] : no skin lesions [Moves all extremities] : moves all extremities [No Focal Deficits] : no focal deficits [Normal Speech] : normal speech [Alert and Oriented] : alert and oriented [Normal memory] : normal memory [General Appearance - Well Developed] : well developed [Normal Appearance] : normal appearance [Well Groomed] : well groomed [General Appearance - Well Nourished] : well nourished [No Deformities] : no deformities [General Appearance - In No Acute Distress] : no acute distress [Normal Conjunctiva] : the conjunctiva exhibited no abnormalities [Normal Oral Mucosa] : normal oral mucosa [Normal Oropharynx] : normal oropharynx [Normal Jugular Venous V Waves Present] : normal jugular venous V waves present [Respiration, Rhythm And Depth] : normal respiratory rhythm and effort [Exaggerated Use Of Accessory Muscles For Inspiration] : no accessory muscle use [Auscultation Breath Sounds / Voice Sounds] : lungs were clear to auscultation bilaterally [Bowel Sounds] : normal bowel sounds [Abdomen Soft] : soft [Abnormal Walk] : normal gait [Gait - Sufficient For Exercise Testing] : the gait was sufficient for exercise testing [Nail Clubbing] : no clubbing of the fingernails [Cyanosis, Localized] : no localized cyanosis [Skin Color & Pigmentation] : normal skin color and pigmentation [Skin Turgor] : normal skin turgor [] : no rash [Oriented To Time, Place, And Person] : oriented to person, place, and time [Impaired Insight] : insight and judgment were intact [Affect] : the affect was normal [Mood] : the mood was normal [No Anxiety] : not feeling anxious [5th Left ICS - MCL] : palpated at the 5th LICS in the midclavicular line [Normal] : normal [No Precordial Heave] : no precordial heave was noted [Normal Rate] : normal [Rhythm Regular] : regular [Normal S1] : normal S1 [Normal S2] : normal S2 [No Gallop] : no gallop heard [II] : a grade 2 [2+] : left 2+ [No Abnormalities] : the abdominal aorta was not enlarged and no bruit was heard [No Pitting Edema] : no pitting edema present [S3] : no S3 [S4] : no S4 [Click] : no click [Pericardial Rub] : no pericardial rub [I] : a grade 1

## 2022-12-13 NOTE — DISCUSSION/SUMMARY
[FreeTextEntry1] : This is a 67-year-old male with past medical history significant for supraventricular tachycardia (probably atrial fibrillation), status post ablation at [facility] by Dr. Olivier 2020, status post loop recorder implantation, hypertension, status post appendectomy, history of syncope, "cardiomyopathy", and comes in for cardiac follow-up evaluation.  He denies chest pain, shortness of breath, palpitations, dizziness or syncope.\par His cardiac risk factors include hypertension, positive family history of atherosclerosis (mother  from a stroke at age 57).  He reports having a normal cholesterol.\par The patient had a normal exercise stress test 2022.\par The patient will follow-up with his electrophysiologist at [facility] and his primary care physician Dr. Wagner.\par He will have new blood work done today for lipid profile.\par The patient is currently hemodynamically stable and asymptomatic from a cardiac standpoint.\par Blood work done 2022 demonstrated a cholesterol 132, HDL 37, triglycerides 128, LDL calculated 70, non-HDL cholesterol 95, and LDL direct of 70 mg/dL.  He will continue on his current dose request\par \par The patient reports that he was told years ago he had an enlarged heart consistent with a cardiomyopathy, but his ejection fraction is normal.  \par Electrocardiogram done 2022 demonstrates sinus bradycardia rate 58 beats per minutes otherwise remarkable for poor R wave progression.\par The patient remains stable from a cardiac standpoint. \par Blood work done 2020 which demonstrated a creatinine of 1.18, GFR 65 cc/min cholesterol 174, triglycerides 82, HDL of 36, and direct LDL of 128 mg/dL.\par Electrocardiogram 2020 demonstrated sinus bradycardia at 58 beats per minutes otherwise unremarkable.\par \par The patient understands that aerobic exercises must be increased to 40 minutes 4 times per week. A detailed discussion of lifestyle modification was done today. The patient has a good understanding of the diagnosis, and treatment plan. Lifestyle modification was also outlined.\par \par

## 2022-12-13 NOTE — ASSESSMENT
[FreeTextEntry1] : Prior note nurse practitioner Lorena 2022::\par \par This is a 67 year year old male here today for follow up cardiac followup evaluation. \par He has a past medical history significant for  supraventricular tachycardia (probably atrial fibrillation), status post ablation at [facility] by Dr. Olivier 2020, status post loop recorder implantation, hypertension, status post appendectomy, history of syncope, "cardiomyopathy".\par \par His cardiac risk factors include hypertension, positive family history of atherosclerosis (mother  from a stroke at age 57).  He reports having a normal cholesterol.\par \par Today he is feeling generally well and does not have any complaints at this time. He does admit that he is non compliant with his blood pressure medications because at home his home readings he is reporting to be under 100.\par \par He is prescribed Carvedilol 12.5mg PO BID, Chlorthalidone 25mg PO DAILY, Losartan 100mg PO DAILY, Rosuvastatin 20mg PO DAILY, Sotalol 80mg PO BID and on Xarelto 20mg PO DAILY. \par \par -Pt is stable from a cardiac standpoint and does not have any complaints at this time. He did have injections done in his back by Dr. Calderon but he states he will "always have back problems".\par \par PMH:\par -BP is elevated in today's visit and patient is on Carvedilol 12.5mg PO BID, Chlorthalidone 25mg PO daily in the morning, Losartan 100mg PO Daily in the evening, Sotalol 80mg (40 mg BID). He states that he does not wish to add or change medication at this time. He is also refusing a 24 hour BP cuff to assess what his home BP is. He states that he has his own BP cuff at home but I explained that this is not efficient enough. I have asked that he follow up in 1 month and to bring his home BP cuff with him to his next appointment. \par \par BLOOD PRESSURE:\par -BP is elevated on today's exam and he does not wish to increase or change meds at this time. He states that he gets normal BP readings while at home. \par \par -I have discussed the importance of maintaining good BP control and reviewed the newest guidelines with the patient while re-enforcing dietary sodium restrictions to no more than 2-3 g daily, DASH diet, life style modifications as well as the goal of maintaining ideal body weight with the patient today. I have advised the patient to avoid the use of over-the-counter medications/ supplements especially NSAIDS.\par \par I have reviewed with Mr. SHAH that serious health consequences can occur when blood pressure is not well controlled and the need for strict compliance with medication and that optimal control can significantly reduce the risk of cardiovascular disease stroke, heart attack and other organ damage. They verbally expressed understanding of the fore mentioned serious health consequences to me today.\par \par BLOOD WORK:\par -New blood work was done today, 2022 to evaluate lipid profile, CBC, BMP, hepatic function, A1C and TSH.\par \par CHOLESTEROL CONTROL:\par -Patient will continue the advised  TLC diet and to continue follow-up for treatment of hyperlipidemia and repeat blood testing with diet and exercise. I have discussed different exercises and the importance of maintenance of optimal body weight. The importance of staying within guidelines and recommendations was stressed to the patient today and they acknowledged that they understand this to me verbally.\par \par  -Mr. SHAH was educated and advised that failure to follow-up with my medical recommendations to lower cholesterol can result in severe health consequences therefore, they will continuing a low saturated and low fat diet and to avoid excessive carbohydrates to help reduce triglycerides and that lowering LDL levels is associated with a significant decrease in serious cardiac events including but not limited to heart attack stroke and overall death. We will continue lipid lowering agents as advised based on blood test results and the patient understands to call if they develop severe muscle discomfort or if they have a reddish tinted discoloration in their urine.\par \par TESTING/REPORTS:\par -EKG done 2022 which demonstrated regular sinus rhythm with nonspecific ST-T wave changes, BPM of 51.\par \par -Electrocardiogram done 2022 demonstrates sinus bradycardia rate 58 beats per minutes otherwise remarkable for poor R wave progression.\par \par -EKG done 2021 which demonstrated regular sinus rhythm with nonspecific ST-T wave changes BPM of 65. He is on Xarelto 20mg PO daily for management of his AFIB.\par  \par -Echocardiogram done  demonstrated minimal mitral, pulmonic and tricuspid regurgitation with normal LVSF.\par \par PLAN:\par -The patient will schedule an Exercise Stress Test rule out significant coronary artery disease and will schedule \par an Echo Doppler examination to evaluate murmur, left ventricular function, chamber size, and rule out hypertrophy.\par -He will continue with his usual medications and will contact the office if he is having any complaints between now and their next follow up appointment.\par -We will revisit 24 hour BP cuff during his next exam if BP remains elevated.\par \par I have discussed the plan of care with Mr. DAISY SHAH  and he  will follow up in 3 months. He is compliant with all of his medications.\par \par The patient understands that aerobic exercises must be increased to minutes 4 times/week and a detailed discussion of lifestyle modification was done today. \par The patient has a good understanding of the diagnosis, treatment plan and lifestyle modification. \par He will contact me at the office for any questions with their care or any changes in their health status.\par \par \par Antione HARRINGTON

## 2023-01-14 PROBLEM — R06.00 DOE (DYSPNEA ON EXERTION): Status: ACTIVE | Noted: 2022-06-08

## 2023-02-09 ENCOUNTER — RX RENEWAL (OUTPATIENT)
Age: 68
End: 2023-02-09

## 2023-06-14 ENCOUNTER — APPOINTMENT (OUTPATIENT)
Dept: CARDIOLOGY | Facility: CLINIC | Age: 68
End: 2023-06-14

## 2023-06-16 ENCOUNTER — LABORATORY RESULT (OUTPATIENT)
Age: 68
End: 2023-06-16

## 2023-06-16 ENCOUNTER — NON-APPOINTMENT (OUTPATIENT)
Age: 68
End: 2023-06-16

## 2023-06-16 ENCOUNTER — APPOINTMENT (OUTPATIENT)
Dept: CARDIOLOGY | Facility: CLINIC | Age: 68
End: 2023-06-16
Payer: MEDICARE

## 2023-06-16 VITALS
OXYGEN SATURATION: 97 % | HEART RATE: 58 BPM | DIASTOLIC BLOOD PRESSURE: 88 MMHG | RESPIRATION RATE: 16 BRPM | SYSTOLIC BLOOD PRESSURE: 136 MMHG | HEIGHT: 71 IN | TEMPERATURE: 98.2 F | BODY MASS INDEX: 26.88 KG/M2 | WEIGHT: 192 LBS

## 2023-06-16 DIAGNOSIS — I42.9 CARDIOMYOPATHY, UNSPECIFIED: ICD-10-CM

## 2023-06-16 PROCEDURE — 93000 ELECTROCARDIOGRAM COMPLETE: CPT

## 2023-06-16 PROCEDURE — 99214 OFFICE O/P EST MOD 30 MIN: CPT | Mod: 25

## 2023-06-16 NOTE — ASSESSMENT
[FreeTextEntry1] : This is a 68 year year old male here today for follow up cardiac followup evaluation. \par He has a past medical history significant for  supraventricular tachycardia (probably atrial fibrillation), status post ablation at [facility] by Dr. Olivier 2020, status post loop recorder implantation, hypertension, status post appendectomy, history of syncope, "cardiomyopathy".\par \par His cardiac risk factors include hypertension, positive family history of atherosclerosis (mother  from a stroke at age 57).  He reports having a normal cholesterol.\par \par Today he is feeling generally well and does not have any complaints at this time.\par \par He is prescribed Carvedilol 6.25mg PO BID (this was lowered by his electrophysiology team), Chlorthalidone 25mg PO DAILY, Losartan 100mg PO DAILY, Rosuvastatin 20mg PO DAILY, Sotalol 80mg PO BID and on Xarelto 20mg PO DAILY. \par \par -Pt is stable from a cardiac standpoint and does not have any complaints at this time. He did have injections done in his back by Dr. Calderon but he states he will "always have back problems".\par \par PMH:\par -BP is elevated in today's visit and patient is on Carvedilol 12.5mg PO BID, Chlorthalidone 25mg PO daily in the morning, Losartan 100mg PO Daily in the evening, Sotalol 80mg (40 mg BID). He states that he does not wish to add or change medication at this time. He is also refusing a 24 hour BP cuff to assess what his home BP is. He states that he has his own BP cuff at home but I explained that this is not efficient enough. I have asked that he follow up in 1 month and to bring his home BP cuff with him to his next appointment. \par \par BLOOD PRESSURE:\par -BP is stable on today's exam\par \par BLOOD WORK:\par -New blood work was done 2023  to evaluate lipid profile, CBC, BMP, hepatic function, A1C and TSH. \par \par TESTING/REPORTS:\par -EKG done 2023 which demonstrated regular sinus rhythm with nonspecific ST-T wave changes BPM of 58.\par \par -Echocardiogram done in the office 2022 demonstrated mild calcification of the mitral valve annulus, mild calcification of the aortic valve leaflets, minimal mitral valve regurgitation, mild tricuspid and trace pulmonic regurgitation with mild thickening of the aortic valve leaflets.  Normal left ventricular systolic function ejection fraction 65%.\par \par -The patient had a normal exercise stress test 2022.\par \par -EKG done 2022 which demonstrated regular sinus rhythm with nonspecific ST-T wave changes, BPM of 51.\par \par -Electrocardiogram done 2022 demonstrates sinus bradycardia rate 58 beats per minutes otherwise remarkable for poor R wave progression.\par \par -EKG done 2021 which demonstrated regular sinus rhythm with nonspecific ST-T wave changes BPM of 65. He is on Xarelto 20mg PO daily for management of his AFIB.\par  \par -Echocardiogram done  demonstrated minimal mitral, pulmonic and tricuspid regurgitation with normal LVSF.\par \par PLAN:\par -The patient is clinically stable from a cardiac standpoint on today's exam. \par -He will continue with his usual medications and will contact the office if he is having any complaints between now and their next follow up appointment.\par  \par I have discussed the plan of care with . DAISY SHAH  and he  will follow up in 3 months. He is compliant with all of his medications.\par \par The patient understands that aerobic exercises must be increased to minutes 4 times/week and a detailed discussion of lifestyle modification was done today. \par The patient has a good understanding of the diagnosis, treatment plan and lifestyle modification. \par He will contact me at the office for any questions with their care or any changes in their health status.\par \par \par Antione HARRINGTON

## 2023-06-16 NOTE — PHYSICAL EXAM
[Well Developed] : well developed [Well Nourished] : well nourished [No Acute Distress] : no acute distress [Normal Venous Pressure] : normal venous pressure [No Carotid Bruit] : no carotid bruit [Normal S1, S2] : normal S1, S2 [No Murmur] : no murmur [No Rub] : no rub [Clear Lung Fields] : clear lung fields [Good Air Entry] : good air entry [No Respiratory Distress] : no respiratory distress  [Soft] : abdomen soft [Non Tender] : non-tender [No Masses/organomegaly] : no masses/organomegaly [Normal Bowel Sounds] : normal bowel sounds [Normal Gait] : normal gait [No Edema] : no edema [No Cyanosis] : no cyanosis [No Clubbing] : no clubbing [No Varicosities] : no varicosities [No Rash] : no rash [No Skin Lesions] : no skin lesions [Moves all extremities] : moves all extremities [No Focal Deficits] : no focal deficits [Normal Speech] : normal speech [Alert and Oriented] : alert and oriented [Normal memory] : normal memory [General Appearance - Well Developed] : well developed [Normal Appearance] : normal appearance [Well Groomed] : well groomed [General Appearance - Well Nourished] : well nourished [No Deformities] : no deformities [General Appearance - In No Acute Distress] : no acute distress [Normal Conjunctiva] : the conjunctiva exhibited no abnormalities [Normal Oral Mucosa] : normal oral mucosa [Normal Oropharynx] : normal oropharynx [Normal Jugular Venous V Waves Present] : normal jugular venous V waves present [Respiration, Rhythm And Depth] : normal respiratory rhythm and effort [Exaggerated Use Of Accessory Muscles For Inspiration] : no accessory muscle use [Auscultation Breath Sounds / Voice Sounds] : lungs were clear to auscultation bilaterally [Bowel Sounds] : normal bowel sounds [Abdomen Soft] : soft [Abnormal Walk] : normal gait [Gait - Sufficient For Exercise Testing] : the gait was sufficient for exercise testing [Nail Clubbing] : no clubbing of the fingernails [Cyanosis, Localized] : no localized cyanosis [Skin Color & Pigmentation] : normal skin color and pigmentation [Skin Turgor] : normal skin turgor [] : no rash [Impaired Insight] : insight and judgment were intact [Oriented To Time, Place, And Person] : oriented to person, place, and time [Affect] : the affect was normal [Mood] : the mood was normal [No Anxiety] : not feeling anxious [5th Left ICS - MCL] : palpated at the 5th LICS in the midclavicular line [Normal] : normal [No Precordial Heave] : no precordial heave was noted [Normal Rate] : normal [Rhythm Regular] : regular [Normal S1] : normal S1 [Normal S2] : normal S2 [No Gallop] : no gallop heard [II] : a grade 2 [I] : a grade 1 [2+] : left 2+ [No Abnormalities] : the abdominal aorta was not enlarged and no bruit was heard [No Pitting Edema] : no pitting edema present [S3] : no S3 [S4] : no S4 [Click] : no click [Pericardial Rub] : no pericardial rub

## 2023-06-16 NOTE — DISCUSSION/SUMMARY
[FreeTextEntry1] : Dr. Hopper-(PRIOR VISIT and PMH WITH Dr. Hopper): \par This is a 67-year-old male with past medical history significant for supraventricular tachycardia (probably atrial fibrillation), status post ablation at [facility] by Dr. Olivier 2020, status post loop recorder implantation, hypertension, status post appendectomy, history of syncope, "cardiomyopathy", and comes in for cardiac follow-up evaluation.  He denies chest pain, shortness of breath, palpitations, dizziness or syncope.\par \par His cardiac risk factors include hypertension, positive family history of atherosclerosis (mother  from a stroke at age 57).  He reports having a normal cholesterol.\par \par The patient had a normal exercise stress test 2022.\par \par The patient will follow-up with his electrophysiologist at [facility] and his primary care physician Dr. Wagner.\par He will have new blood work done today for lipid profile.\par The patient is currently hemodynamically stable and asymptomatic from a cardiac standpoint.\par Blood work done 2022 demonstrated a cholesterol 132, HDL 37, triglycerides 128, LDL calculated 70, non-HDL cholesterol 95, and LDL direct of 70 mg/dL.  He will continue on his current dose request\par \par The patient reports that he was told years ago he had an enlarged heart consistent with a cardiomyopathy, but his ejection fraction is normal.  \par Electrocardiogram done 2022 demonstrates sinus bradycardia rate 58 beats per minutes otherwise remarkable for poor R wave progression.\par The patient remains stable from a cardiac standpoint. \par Blood work done 2020 which demonstrated a creatinine of 1.18, GFR 65 cc/min cholesterol 174, triglycerides 82, HDL of 36, and direct LDL of 128 mg/dL.\par Electrocardiogram 2020 demonstrated sinus bradycardia at 58 beats per minutes otherwise unremarkable.\par \par The patient understands that aerobic exercises must be increased to 40 minutes 4 times per week. A detailed discussion of lifestyle modification was done today. The patient has a good understanding of the diagnosis, and treatment plan. Lifestyle modification was also outlined.\par \par

## 2023-11-21 ENCOUNTER — APPOINTMENT (OUTPATIENT)
Dept: CARDIOLOGY | Facility: CLINIC | Age: 68
End: 2023-11-21

## 2024-03-28 ENCOUNTER — LABORATORY RESULT (OUTPATIENT)
Age: 69
End: 2024-03-28

## 2024-03-28 ENCOUNTER — APPOINTMENT (OUTPATIENT)
Dept: CARDIOLOGY | Facility: CLINIC | Age: 69
End: 2024-03-28
Payer: MEDICARE

## 2024-03-28 ENCOUNTER — NON-APPOINTMENT (OUTPATIENT)
Age: 69
End: 2024-03-28

## 2024-03-28 VITALS
WEIGHT: 197.38 LBS | SYSTOLIC BLOOD PRESSURE: 134 MMHG | HEIGHT: 71 IN | RESPIRATION RATE: 16 BRPM | HEART RATE: 63 BPM | DIASTOLIC BLOOD PRESSURE: 81 MMHG | BODY MASS INDEX: 27.63 KG/M2 | TEMPERATURE: 98.4 F | OXYGEN SATURATION: 96 %

## 2024-03-28 PROCEDURE — G2211 COMPLEX E/M VISIT ADD ON: CPT

## 2024-03-28 PROCEDURE — 99214 OFFICE O/P EST MOD 30 MIN: CPT

## 2024-03-28 PROCEDURE — 93000 ELECTROCARDIOGRAM COMPLETE: CPT

## 2024-03-28 NOTE — ASSESSMENT
[FreeTextEntry1] : Prior note nurse practitioner Lorena 2023::  This is a 68 year year old male here today for follow up cardiac followup evaluation.  He has a past medical history significant for supraventricular tachycardia (probably atrial fibrillation), status post ablation at [facility] by Dr. Olivier 2020, status post loop recorder implantation, hypertension, status post appendectomy, history of syncope, "cardiomyopathy".  His cardiac risk factors include hypertension, positive family history of atherosclerosis (mother  from a stroke at age 57).  He reports having a normal cholesterol.  Today he is feeling generally well and does not have any complaints at this time.  He is prescribed Carvedilol 6.25mg PO BID (this was lowered by his electrophysiology team), Chlorthalidone 25mg PO DAILY, Losartan 100mg PO DAILY, Rosuvastatin 20mg PO DAILY, Sotalol 80mg PO BID and on Xarelto 20mg PO DAILY.   -Pt is stable from a cardiac standpoint and does not have any complaints at this time. He did have injections done in his back by Dr. Calderon but he states he will "always have back problems".  PMH: -BP is elevated in today's visit and patient is on Carvedilol 12.5mg PO BID, Chlorthalidone 25mg PO daily in the morning, Losartan 100mg PO Daily in the evening, Sotalol 80mg (40 mg BID). He states that he does not wish to add or change medication at this time. He is also refusing a 24 hour BP cuff to assess what his home BP is. He states that he has his own BP cuff at home but I explained that this is not efficient enough. I have asked that he follow up in 1 month and to bring his home BP cuff with him to his next appointment.   BLOOD PRESSURE: -BP is stable on today's exam  BLOOD WORK: -New blood work was done 2023  to evaluate lipid profile, CBC, BMP, hepatic function, A1C and TSH.   TESTING/REPORTS: -EKG done 2023 which demonstrated regular sinus rhythm with nonspecific ST-T wave changes BPM of 58.  -Echocardiogram done in the office 2022 demonstrated mild calcification of the mitral valve annulus, mild calcification of the aortic valve leaflets, minimal mitral valve regurgitation, mild tricuspid and trace pulmonic regurgitation with mild thickening of the aortic valve leaflets.  Normal left ventricular systolic function ejection fraction 65%.  -The patient had a normal exercise stress test 2022.  -EKG done 2022 which demonstrated regular sinus rhythm with nonspecific ST-T wave changes, BPM of 51.  -Electrocardiogram done 2022 demonstrates sinus bradycardia rate 58 beats per minutes otherwise remarkable for poor R wave progression.  -EKG done 2021 which demonstrated regular sinus rhythm with nonspecific ST-T wave changes BPM of 65. He is on Xarelto 20mg PO daily for management of his AFIB.   -Echocardiogram done  demonstrated minimal mitral, pulmonic and tricuspid regurgitation with normal LVSF.  PLAN: -The patient is clinically stable from a cardiac standpoint on today's exam.  -He will continue with his usual medications and will contact the office if he is having any complaints between now and their next follow up appointment.   I have discussed the plan of care with Mr. DAISY SHAH  and he  will follow up in 3 months. He is compliant with all of his medications.  The patient understands that aerobic exercises must be increased to minutes 4 times/week and a detailed discussion of lifestyle modification was done today.  The patient has a good understanding of the diagnosis, treatment plan and lifestyle modification.  He will contact me at the office for any questions with their care or any changes in their health status.   Antione HARRINGTON

## 2024-03-28 NOTE — REASON FOR VISIT
[CV Risk Factors and Non-Cardiac Disease] : CV risk factors and non-cardiac disease [Hyperlipidemia] : hyperlipidemia [Follow-Up - Clinic] : a clinic follow-up of [Mitral Regurgitation] : mitral regurgitation [Hypertension] : hypertension [Cardiomyopathy] : cardiomyopathy [FreeTextEntry1] : s/p ablation, murmur

## 2024-03-28 NOTE — PHYSICAL EXAM
[Well Developed] : well developed [Well Nourished] : well nourished [No Acute Distress] : no acute distress [No Carotid Bruit] : no carotid bruit [Normal Venous Pressure] : normal venous pressure [Normal S1, S2] : normal S1, S2 [No Murmur] : no murmur [No Rub] : no rub [Good Air Entry] : good air entry [Clear Lung Fields] : clear lung fields [No Respiratory Distress] : no respiratory distress  [Soft] : abdomen soft [Non Tender] : non-tender [No Masses/organomegaly] : no masses/organomegaly [Normal Bowel Sounds] : normal bowel sounds [Normal Gait] : normal gait [No Edema] : no edema [No Clubbing] : no clubbing [No Cyanosis] : no cyanosis [No Varicosities] : no varicosities [No Rash] : no rash [No Skin Lesions] : no skin lesions [Moves all extremities] : moves all extremities [No Focal Deficits] : no focal deficits [Normal Speech] : normal speech [Alert and Oriented] : alert and oriented [Normal memory] : normal memory [General Appearance - Well Developed] : well developed [Normal Appearance] : normal appearance [General Appearance - Well Nourished] : well nourished [Well Groomed] : well groomed [No Deformities] : no deformities [Normal Conjunctiva] : the conjunctiva exhibited no abnormalities [General Appearance - In No Acute Distress] : no acute distress [Normal Oral Mucosa] : normal oral mucosa [Normal Oropharynx] : normal oropharynx [Normal Jugular Venous V Waves Present] : normal jugular venous V waves present [Respiration, Rhythm And Depth] : normal respiratory rhythm and effort [Exaggerated Use Of Accessory Muscles For Inspiration] : no accessory muscle use [Auscultation Breath Sounds / Voice Sounds] : lungs were clear to auscultation bilaterally [Bowel Sounds] : normal bowel sounds [Gait - Sufficient For Exercise Testing] : the gait was sufficient for exercise testing [Abdomen Soft] : soft [Abnormal Walk] : normal gait [Cyanosis, Localized] : no localized cyanosis [Nail Clubbing] : no clubbing of the fingernails [] : no rash [Skin Color & Pigmentation] : normal skin color and pigmentation [Skin Turgor] : normal skin turgor [Impaired Insight] : insight and judgment were intact [Oriented To Time, Place, And Person] : oriented to person, place, and time [Affect] : the affect was normal [No Anxiety] : not feeling anxious [Mood] : the mood was normal [5th Left ICS - MCL] : palpated at the 5th LICS in the midclavicular line [No Precordial Heave] : no precordial heave was noted [Normal] : normal [Normal S1] : normal S1 [Normal Rate] : normal [Rhythm Regular] : regular [No Gallop] : no gallop heard [Normal S2] : normal S2 [I] : a grade 1 [II] : a grade 2 [No Abnormalities] : the abdominal aorta was not enlarged and no bruit was heard [2+] : left 2+ [No Pitting Edema] : no pitting edema present [S3] : no S3 [Click] : no click [S4] : no S4 [Pericardial Rub] : no pericardial rub

## 2024-03-28 NOTE — DISCUSSION/SUMMARY
[FreeTextEntry1] : This is a 69-year-old male with past medical history significant for supraventricular tachycardia (probably atrial fibrillation), status post ablation at [facility] by Dr. Olivier 2020, status post loop recorder implantation, hypertension, sleep apnea (not on CPAP therapy), status post appendectomy, history of syncope, "cardiomyopathy", and comes in for cardiac follow-up evaluation.   He denies chest pain, shortness of breath, dizziness or syncope.  He does complain of morning palpitations. His cardiac risk factors include hypertension, positive family history of atherosclerosis (mother  from a stroke at age 57).  He reports having a normal cholesterol. Electrocardiogram done 2024 demonstrates sinus bradycardia rate of 60 bpm is otherwise remarkable for poor R wave progression. I have recommended he follow-up with his pulmonary physician regarding treatment of his sleep apnea.  This may decrease the number of episodes of supraventricular arrhythmia.  He will be following up with the electrophysiology team from [facility] will analyze his loop recorder.  He remains on Xarelto 20 mg in the evening for anticoagulation. His carvedilol was lowered to 6.25 mg twice a day, and remains on chlorthalidone 25 mg daily, losartan 100 mg/day, Crestor 20 mg/day, sotalol 40 mg twice per day and Xarelto 20 mg in the evening. He will have new blood work done today for lipid panel and electrolytes. Lipid panel done 2023 demonstrated cholesterol 155, HDL 45, triglycerides 120, LDL direct 95, non-HDL cholesterol 110 mg/dL with hemoglobin A1c of 6.0. The patient had a normal exercise stress test 2022. The patient will follow-up with his primary care physician Dr. Wagner. The patient is currently hemodynamically stable and asymptomatic from a cardiac standpoint. Blood work done 2022 demonstrated a cholesterol 132, HDL 37, triglycerides 128, LDL calculated 70, non-HDL cholesterol 95, and LDL direct of 70 mg/dL.  He will continue on his current dose request  The patient reports that he was told years ago he had an enlarged heart consistent with a cardiomyopathy, but his ejection fraction is normal.   Electrocardiogram done 2022 demonstrates sinus bradycardia rate 58 beats per minutes otherwise remarkable for poor R wave progression. The patient remains stable from a cardiac standpoint.  Blood work done 2020 which demonstrated a creatinine of 1.18, GFR 65 cc/min cholesterol 174, triglycerides 82, HDL of 36, and direct LDL of 128 mg/dL. Electrocardiogram 2020 demonstrated sinus bradycardia at 58 beats per minutes otherwise unremarkable.  The patient understands that aerobic exercises must be increased to 40 minutes 4 times per week. A detailed discussion of lifestyle modification was done today. The patient has a good understanding of the diagnosis, and treatment plan. Lifestyle modification was also outlined.

## 2024-04-01 ENCOUNTER — TRANSCRIPTION ENCOUNTER (OUTPATIENT)
Age: 69
End: 2024-04-01

## 2024-04-15 ENCOUNTER — APPOINTMENT (OUTPATIENT)
Dept: CARDIOLOGY | Facility: CLINIC | Age: 69
End: 2024-04-15
Payer: MEDICARE

## 2024-04-15 PROCEDURE — 93306 TTE W/DOPPLER COMPLETE: CPT

## 2024-04-21 RX ORDER — CARVEDILOL 6.25 MG/1
6.25 TABLET, FILM COATED ORAL TWICE DAILY
Qty: 180 | Refills: 1 | Status: ACTIVE | COMMUNITY
Start: 2020-08-25 | End: 1900-01-01

## 2024-04-21 RX ORDER — SOTALOL HYDROCHLORIDE 80 MG/1
80 TABLET ORAL
Qty: 90 | Refills: 1 | Status: ACTIVE | COMMUNITY
Start: 2020-04-06 | End: 1900-01-01

## 2024-05-07 RX ORDER — LOSARTAN POTASSIUM 100 MG/1
100 TABLET, FILM COATED ORAL
Qty: 90 | Refills: 1 | Status: ACTIVE | COMMUNITY
Start: 2020-04-06 | End: 1900-01-01

## 2024-05-07 RX ORDER — ROSUVASTATIN CALCIUM 20 MG/1
20 TABLET, FILM COATED ORAL
Qty: 90 | Refills: 1 | Status: ACTIVE | COMMUNITY
Start: 2020-11-18 | End: 1900-01-01

## 2024-05-07 RX ORDER — CHLORTHALIDONE 25 MG/1
25 TABLET ORAL DAILY
Qty: 90 | Refills: 1 | Status: ACTIVE | COMMUNITY
Start: 2020-04-06 | End: 1900-01-01

## 2024-05-07 RX ORDER — RIVAROXABAN 20 MG/1
20 TABLET, FILM COATED ORAL
Qty: 90 | Refills: 1 | Status: ACTIVE | COMMUNITY
Start: 2020-04-06 | End: 1900-01-01

## 2024-05-10 ENCOUNTER — NON-APPOINTMENT (OUTPATIENT)
Age: 69
End: 2024-05-10

## 2024-06-25 ENCOUNTER — APPOINTMENT (OUTPATIENT)
Dept: CARDIOLOGY | Facility: CLINIC | Age: 69
End: 2024-06-25
Payer: MEDICARE

## 2024-06-25 VITALS
HEIGHT: 71 IN | HEART RATE: 66 BPM | DIASTOLIC BLOOD PRESSURE: 84 MMHG | RESPIRATION RATE: 16 BRPM | WEIGHT: 185 LBS | OXYGEN SATURATION: 98 % | SYSTOLIC BLOOD PRESSURE: 130 MMHG | BODY MASS INDEX: 25.9 KG/M2 | TEMPERATURE: 98.2 F

## 2024-06-25 DIAGNOSIS — I07.1 RHEUMATIC TRICUSPID INSUFFICIENCY: ICD-10-CM

## 2024-06-25 DIAGNOSIS — Z86.79 PERSONAL HISTORY OF OTHER DISEASES OF THE CIRCULATORY SYSTEM: ICD-10-CM

## 2024-06-25 DIAGNOSIS — Z86.79 OTHER SPECIFIED POSTPROCEDURAL STATES: ICD-10-CM

## 2024-06-25 DIAGNOSIS — Z98.890 OTHER SPECIFIED POSTPROCEDURAL STATES: ICD-10-CM

## 2024-06-25 DIAGNOSIS — Z79.01 LONG TERM (CURRENT) USE OF ANTICOAGULANTS: ICD-10-CM

## 2024-06-25 DIAGNOSIS — E78.00 PURE HYPERCHOLESTEROLEMIA, UNSPECIFIED: ICD-10-CM

## 2024-06-25 DIAGNOSIS — I10 ESSENTIAL (PRIMARY) HYPERTENSION: ICD-10-CM

## 2024-06-25 DIAGNOSIS — I34.0 NONRHEUMATIC MITRAL (VALVE) INSUFFICIENCY: ICD-10-CM

## 2024-06-25 PROCEDURE — G2211 COMPLEX E/M VISIT ADD ON: CPT

## 2024-06-25 PROCEDURE — 99214 OFFICE O/P EST MOD 30 MIN: CPT

## 2024-10-30 ENCOUNTER — APPOINTMENT (OUTPATIENT)
Dept: CARDIOLOGY | Facility: CLINIC | Age: 69
End: 2024-10-30